# Patient Record
Sex: FEMALE | Race: BLACK OR AFRICAN AMERICAN | NOT HISPANIC OR LATINO | Employment: UNEMPLOYED
[De-identification: names, ages, dates, MRNs, and addresses within clinical notes are randomized per-mention and may not be internally consistent; named-entity substitution may affect disease eponyms.]

---

## 2017-03-31 ENCOUNTER — CHARTING TRANS (OUTPATIENT)
Dept: OTHER | Age: 42
End: 2017-03-31

## 2017-03-31 ENCOUNTER — LAB SERVICES (OUTPATIENT)
Dept: OTHER | Age: 42
End: 2017-03-31

## 2017-03-31 LAB — RAPID STREP GROUP A: NORMAL

## 2017-04-05 ENCOUNTER — CHARTING TRANS (OUTPATIENT)
Dept: OTHER | Age: 42
End: 2017-04-05

## 2017-04-05 LAB — CULTURE STREP GRP A (STTH) HL: NORMAL

## 2017-04-14 ENCOUNTER — CHARTING TRANS (OUTPATIENT)
Dept: OTHER | Age: 42
End: 2017-04-14

## 2017-12-13 ENCOUNTER — CHARTING TRANS (OUTPATIENT)
Dept: OTHER | Age: 42
End: 2017-12-13

## 2018-04-18 ENCOUNTER — CHARTING TRANS (OUTPATIENT)
Dept: OTHER | Age: 43
End: 2018-04-18

## 2018-09-25 ENCOUNTER — LAB SERVICES (OUTPATIENT)
Dept: OTHER | Age: 43
End: 2018-09-25

## 2018-09-25 ENCOUNTER — CHARTING TRANS (OUTPATIENT)
Dept: OTHER | Age: 43
End: 2018-09-25

## 2018-09-25 LAB — RAPID STREP GROUP A: NORMAL

## 2018-09-25 ASSESSMENT — PAIN SCALES - GENERAL: PAINLEVEL_OUTOF10: 5

## 2018-09-26 ENCOUNTER — CHARTING TRANS (OUTPATIENT)
Dept: OTHER | Age: 43
End: 2018-09-26

## 2018-09-26 ASSESSMENT — PAIN SCALES - GENERAL: PAINLEVEL_OUTOF10: 0

## 2018-11-01 VITALS
DIASTOLIC BLOOD PRESSURE: 74 MMHG | OXYGEN SATURATION: 98 % | RESPIRATION RATE: 20 BRPM | SYSTOLIC BLOOD PRESSURE: 118 MMHG | HEART RATE: 67 BPM | TEMPERATURE: 98.6 F

## 2018-11-02 VITALS — TEMPERATURE: 98.8 F | HEART RATE: 94 BPM | RESPIRATION RATE: 20 BRPM | OXYGEN SATURATION: 98 %

## 2018-11-04 VITALS — HEART RATE: 73 BPM | OXYGEN SATURATION: 98 % | RESPIRATION RATE: 20 BRPM | TEMPERATURE: 98.5 F

## 2018-11-05 VITALS
BODY MASS INDEX: 29.82 KG/M2 | WEIGHT: 189.99 LBS | TEMPERATURE: 99.4 F | OXYGEN SATURATION: 99 % | HEART RATE: 85 BPM | HEIGHT: 67 IN | RESPIRATION RATE: 18 BRPM

## 2018-11-27 VITALS
OXYGEN SATURATION: 99 % | DIASTOLIC BLOOD PRESSURE: 80 MMHG | BODY MASS INDEX: 30.37 KG/M2 | SYSTOLIC BLOOD PRESSURE: 102 MMHG | RESPIRATION RATE: 16 BRPM | WEIGHT: 189 LBS | TEMPERATURE: 98.7 F | HEART RATE: 74 BPM | HEIGHT: 66 IN

## 2018-11-27 VITALS — TEMPERATURE: 98.3 F | OXYGEN SATURATION: 97 % | RESPIRATION RATE: 18 BRPM | HEART RATE: 90 BPM

## 2019-02-11 ENCOUNTER — WALK IN (OUTPATIENT)
Dept: URGENT CARE | Age: 44
End: 2019-02-11

## 2019-02-11 DIAGNOSIS — R50.9 FEVER, UNSPECIFIED FEVER CAUSE: ICD-10-CM

## 2019-02-11 DIAGNOSIS — R68.89 FLU-LIKE SYMPTOMS: ICD-10-CM

## 2019-02-11 DIAGNOSIS — J02.9 SORE THROAT: ICD-10-CM

## 2019-02-11 DIAGNOSIS — J11.1 INFLUENZA: Primary | ICD-10-CM

## 2019-02-11 LAB
FLUAV AG UPPER RESP QL IA.RAPID: NEGATIVE
FLUBV AG UPPER RESP QL IA.RAPID: NEGATIVE
INTERNAL PROCEDURAL CONTROLS ACCEPTABLE: YES
S PYO AG THROAT QL IA.RAPID: NEGATIVE

## 2019-02-11 PROCEDURE — 87880 STREP A ASSAY W/OPTIC: CPT | Performed by: NURSE PRACTITIONER

## 2019-02-11 PROCEDURE — 87804 INFLUENZA ASSAY W/OPTIC: CPT | Performed by: NURSE PRACTITIONER

## 2019-02-11 PROCEDURE — 99214 OFFICE O/P EST MOD 30 MIN: CPT | Performed by: NURSE PRACTITIONER

## 2019-02-11 RX ORDER — MONTELUKAST SODIUM 10 MG/1
TABLET ORAL
COMMUNITY
Start: 2018-10-17 | End: 2019-03-01 | Stop reason: SDUPTHER

## 2019-02-11 RX ORDER — OSELTAMIVIR PHOSPHATE 75 MG/1
75 CAPSULE ORAL 2 TIMES DAILY
Qty: 10 CAPSULE | Refills: 0 | Status: SHIPPED | OUTPATIENT
Start: 2019-02-11 | End: 2019-02-16

## 2019-02-11 RX ORDER — IBUPROFEN 600 MG/1
600 TABLET ORAL EVERY 6 HOURS PRN
Qty: 30 TABLET | Refills: 0 | Status: SHIPPED | OUTPATIENT
Start: 2019-02-11 | End: 2019-03-18 | Stop reason: SDUPTHER

## 2019-02-11 RX ORDER — ALBUTEROL SULFATE 90 UG/1
AEROSOL, METERED RESPIRATORY (INHALATION)
COMMUNITY
End: 2019-03-26 | Stop reason: SDUPTHER

## 2019-02-11 SDOH — HEALTH STABILITY: MENTAL HEALTH: HOW OFTEN DO YOU HAVE A DRINK CONTAINING ALCOHOL?: NEVER

## 2019-02-11 ASSESSMENT — ENCOUNTER SYMPTOMS
FATIGUE: 1
DIARRHEA: 0
PSYCHIATRIC NEGATIVE: 1
COUGH: 1
VOMITING: 0
NAUSEA: 0
SORE THROAT: 1
CHILLS: 1
SHORTNESS OF BREATH: 0
WHEEZING: 0
EYES NEGATIVE: 1
FEVER: 1

## 2019-02-11 ASSESSMENT — PAIN SCALES - GENERAL: PAINLEVEL: 9-10

## 2019-03-01 RX ORDER — MONTELUKAST SODIUM 10 MG/1
TABLET ORAL
Qty: 90 TABLET | Refills: 3 | Status: SHIPPED | OUTPATIENT
Start: 2019-03-01 | End: 2019-03-26 | Stop reason: SDUPTHER

## 2019-03-18 ENCOUNTER — WALK IN (OUTPATIENT)
Dept: URGENT CARE | Age: 44
End: 2019-03-18

## 2019-03-18 DIAGNOSIS — R50.9 FEVER, UNSPECIFIED FEVER CAUSE: ICD-10-CM

## 2019-03-18 DIAGNOSIS — J11.1 INFLUENZA: ICD-10-CM

## 2019-03-18 DIAGNOSIS — J01.20 ACUTE NON-RECURRENT ETHMOIDAL SINUSITIS: Primary | ICD-10-CM

## 2019-03-18 DIAGNOSIS — J02.9 SORE THROAT: ICD-10-CM

## 2019-03-18 PROCEDURE — 99214 OFFICE O/P EST MOD 30 MIN: CPT | Performed by: NURSE PRACTITIONER

## 2019-03-18 RX ORDER — IBUPROFEN 600 MG/1
600 TABLET ORAL EVERY 6 HOURS PRN
Qty: 30 TABLET | Refills: 0 | Status: SHIPPED | OUTPATIENT
Start: 2019-03-18

## 2019-03-18 RX ORDER — DOXYCYCLINE 100 MG/1
100 CAPSULE ORAL 2 TIMES DAILY
Qty: 14 CAPSULE | Refills: 0 | Status: SHIPPED | OUTPATIENT
Start: 2019-03-18 | End: 2019-03-25

## 2019-03-18 RX ORDER — IBUPROFEN 600 MG/1
600 TABLET ORAL EVERY 6 HOURS PRN
Qty: 30 TABLET | Refills: 0 | Status: SHIPPED | OUTPATIENT
Start: 2019-03-18 | End: 2019-03-18 | Stop reason: SDUPTHER

## 2019-03-18 SDOH — HEALTH STABILITY: MENTAL HEALTH: HOW OFTEN DO YOU HAVE A DRINK CONTAINING ALCOHOL?: NEVER

## 2019-03-18 ASSESSMENT — PAIN SCALES - GENERAL: PAINLEVEL: 9-10

## 2019-03-18 ASSESSMENT — ENCOUNTER SYMPTOMS
SINUS PAIN: 1
CHILLS: 0
HEADACHES: 1
NUMBNESS: 0
SORE THROAT: 1
RHINORRHEA: 1
WEAKNESS: 0
FEVER: 0
RESPIRATORY NEGATIVE: 1
DIZZINESS: 0
SINUS PRESSURE: 1
FATIGUE: 1

## 2019-03-26 ENCOUNTER — WALK IN (OUTPATIENT)
Dept: URGENT CARE | Age: 44
End: 2019-03-26

## 2019-03-26 VITALS
RESPIRATION RATE: 18 BRPM | OXYGEN SATURATION: 98 % | TEMPERATURE: 98.3 F | HEART RATE: 78 BPM | DIASTOLIC BLOOD PRESSURE: 80 MMHG | SYSTOLIC BLOOD PRESSURE: 122 MMHG

## 2019-03-26 DIAGNOSIS — J98.01 BRONCHOSPASM, ACUTE: ICD-10-CM

## 2019-03-26 DIAGNOSIS — J45.30 MILD PERSISTENT ASTHMA WITHOUT COMPLICATION: Primary | ICD-10-CM

## 2019-03-26 PROCEDURE — 99214 OFFICE O/P EST MOD 30 MIN: CPT | Performed by: NURSE PRACTITIONER

## 2019-03-26 RX ORDER — AZITHROMYCIN 250 MG/1
TABLET, FILM COATED ORAL
Qty: 6 TABLET | Refills: 0 | Status: SHIPPED | OUTPATIENT
Start: 2019-03-26 | End: 2019-10-21 | Stop reason: ALTCHOICE

## 2019-03-26 RX ORDER — MONTELUKAST SODIUM 10 MG/1
10 TABLET ORAL DAILY
Qty: 90 TABLET | Refills: 3 | Status: SHIPPED | OUTPATIENT
Start: 2019-03-26 | End: 2019-10-21 | Stop reason: SDUPTHER

## 2019-03-26 RX ORDER — ALBUTEROL SULFATE 90 UG/1
2 AEROSOL, METERED RESPIRATORY (INHALATION)
Qty: 1 INHALER | Refills: 12 | Status: SHIPPED | OUTPATIENT
Start: 2019-03-26 | End: 2019-10-21 | Stop reason: SDUPTHER

## 2019-03-26 RX ORDER — FLUTICASONE PROPIONATE 50 MCG
2 SPRAY, SUSPENSION (ML) NASAL DAILY
Qty: 1 BOTTLE | Refills: 12 | Status: SHIPPED | OUTPATIENT
Start: 2019-03-26 | End: 2023-11-06 | Stop reason: ALTCHOICE

## 2019-03-26 RX ORDER — BENZONATATE 100 MG/1
CAPSULE ORAL
Qty: 60 CAPSULE | Refills: 0 | Status: SHIPPED | OUTPATIENT
Start: 2019-03-26 | End: 2019-10-21 | Stop reason: SDUPTHER

## 2019-03-26 SDOH — HEALTH STABILITY: MENTAL HEALTH: HOW OFTEN DO YOU HAVE A DRINK CONTAINING ALCOHOL?: NEVER

## 2019-03-26 ASSESSMENT — ENCOUNTER SYMPTOMS
SORE THROAT: 0
HEADACHES: 0
TINGLING: 0
SPUTUM PRODUCTION: 0
COUGH: 1
DIZZINESS: 0
SINUS PAIN: 1
FEVER: 0
SHORTNESS OF BREATH: 0
CHILLS: 0
WHEEZING: 0

## 2019-07-03 ENCOUNTER — WALK IN (OUTPATIENT)
Dept: URGENT CARE | Age: 44
End: 2019-07-03

## 2019-07-03 VITALS
DIASTOLIC BLOOD PRESSURE: 70 MMHG | HEART RATE: 78 BPM | TEMPERATURE: 98.7 F | SYSTOLIC BLOOD PRESSURE: 116 MMHG | OXYGEN SATURATION: 98 % | RESPIRATION RATE: 18 BRPM

## 2019-07-03 DIAGNOSIS — J30.1 SEASONAL ALLERGIC RHINITIS DUE TO POLLEN: Primary | ICD-10-CM

## 2019-07-03 PROCEDURE — 99214 OFFICE O/P EST MOD 30 MIN: CPT | Performed by: NURSE PRACTITIONER

## 2019-07-03 RX ORDER — MONTELUKAST SODIUM 10 MG/1
10 TABLET ORAL NIGHTLY
Qty: 30 TABLET | Refills: 3 | Status: SHIPPED | OUTPATIENT
Start: 2019-07-03 | End: 2021-04-30 | Stop reason: SDUPTHER

## 2019-07-03 SDOH — HEALTH STABILITY: MENTAL HEALTH: HOW OFTEN DO YOU HAVE A DRINK CONTAINING ALCOHOL?: NEVER

## 2019-07-03 ASSESSMENT — ENCOUNTER SYMPTOMS
RHINORRHEA: 1
CHEST TIGHTNESS: 0
SORE THROAT: 0
SINUS PAIN: 0
FEVER: 0
WHEEZING: 0
CHILLS: 0
SHORTNESS OF BREATH: 0
TROUBLE SWALLOWING: 0
COUGH: 0
SINUS PRESSURE: 0

## 2019-09-26 ENCOUNTER — WALK IN (OUTPATIENT)
Dept: URGENT CARE | Age: 44
End: 2019-09-26
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
## 2019-09-26 PROCEDURE — 99213 OFFICE O/P EST LOW 20 MIN: CPT | Performed by: NURSE PRACTITIONER

## 2019-09-26 SDOH — HEALTH STABILITY: MENTAL HEALTH: HOW OFTEN DO YOU HAVE A DRINK CONTAINING ALCOHOL?: NEVER

## 2019-09-26 ASSESSMENT — ENCOUNTER SYMPTOMS
CONSTITUTIONAL NEGATIVE: 1
ROS SKIN COMMENTS: PER HPI
RESPIRATORY NEGATIVE: 1

## 2019-09-26 ASSESSMENT — PAIN SCALES - GENERAL: PAINLEVEL: 5-6

## 2019-10-21 ENCOUNTER — WALK IN (OUTPATIENT)
Dept: URGENT CARE | Age: 44
End: 2019-10-21

## 2019-10-21 VITALS
HEART RATE: 70 BPM | RESPIRATION RATE: 20 BRPM | OXYGEN SATURATION: 98 % | DIASTOLIC BLOOD PRESSURE: 88 MMHG | SYSTOLIC BLOOD PRESSURE: 140 MMHG | TEMPERATURE: 98.9 F

## 2019-10-21 DIAGNOSIS — J01.00 ACUTE NON-RECURRENT MAXILLARY SINUSITIS: ICD-10-CM

## 2019-10-21 DIAGNOSIS — J30.1 SEASONAL ALLERGIC RHINITIS DUE TO POLLEN: ICD-10-CM

## 2019-10-21 DIAGNOSIS — J45.41 MODERATE PERSISTENT ASTHMA WITH EXACERBATION: Primary | ICD-10-CM

## 2019-10-21 DIAGNOSIS — J30.2 SEASONAL ALLERGIES: ICD-10-CM

## 2019-10-21 PROCEDURE — 99213 OFFICE O/P EST LOW 20 MIN: CPT | Performed by: NURSE PRACTITIONER

## 2019-10-21 RX ORDER — PREDNISONE 20 MG/1
40 TABLET ORAL DAILY
Qty: 10 TABLET | Refills: 0 | Status: SHIPPED | OUTPATIENT
Start: 2019-10-21 | End: 2019-10-26

## 2019-10-21 RX ORDER — DOXYCYCLINE HYCLATE 100 MG/1
100 CAPSULE ORAL 2 TIMES DAILY
Qty: 14 CAPSULE | Refills: 0 | Status: SHIPPED | OUTPATIENT
Start: 2019-10-21 | End: 2019-10-28

## 2019-10-21 RX ORDER — ALBUTEROL SULFATE 2.5 MG/3ML
2.5 SOLUTION RESPIRATORY (INHALATION) EVERY 4 HOURS PRN
Qty: 375 ML | Refills: 12 | Status: SHIPPED | OUTPATIENT
Start: 2019-10-21 | End: 2019-11-20

## 2019-10-21 RX ORDER — MONTELUKAST SODIUM 10 MG/1
10 TABLET ORAL DAILY
Qty: 90 TABLET | Refills: 3 | Status: SHIPPED | OUTPATIENT
Start: 2019-10-21 | End: 2019-10-21 | Stop reason: SDUPTHER

## 2019-10-21 RX ORDER — ALBUTEROL SULFATE 90 UG/1
2 AEROSOL, METERED RESPIRATORY (INHALATION)
Qty: 1 INHALER | Refills: 12 | Status: SHIPPED | OUTPATIENT
Start: 2019-10-21

## 2019-10-21 RX ORDER — ALBUTEROL SULFATE 90 UG/1
2 AEROSOL, METERED RESPIRATORY (INHALATION)
Qty: 1 INHALER | Refills: 12 | Status: SHIPPED | OUTPATIENT
Start: 2019-10-21 | End: 2019-10-21 | Stop reason: SDUPTHER

## 2019-10-21 RX ORDER — BENZONATATE 100 MG/1
CAPSULE ORAL
Qty: 60 CAPSULE | Refills: 0 | Status: SHIPPED | OUTPATIENT
Start: 2019-10-21 | End: 2023-10-11 | Stop reason: ALTCHOICE

## 2019-10-21 RX ORDER — PREDNISONE 20 MG/1
40 TABLET ORAL DAILY
Qty: 10 TABLET | Refills: 0 | Status: SHIPPED | OUTPATIENT
Start: 2019-10-21 | End: 2019-10-21 | Stop reason: SDUPTHER

## 2019-10-21 ASSESSMENT — ENCOUNTER SYMPTOMS
DIZZINESS: 0
SHORTNESS OF BREATH: 1
SINUS PAIN: 0
TINGLING: 0
COUGH: 1
CHILLS: 1
SORE THROAT: 1
SPUTUM PRODUCTION: 0
WHEEZING: 0
HEADACHES: 1

## 2020-01-14 ENCOUNTER — TELEPHONE (OUTPATIENT)
Dept: SCHEDULING | Age: 45
End: 2020-01-14

## 2020-01-22 ENCOUNTER — APPOINTMENT (OUTPATIENT)
Dept: INTERNAL MEDICINE | Age: 45
End: 2020-01-22

## 2020-01-22 ENCOUNTER — TELEPHONE (OUTPATIENT)
Dept: SCHEDULING | Age: 45
End: 2020-01-22

## 2020-03-16 ENCOUNTER — TELEPHONE (OUTPATIENT)
Dept: SCHEDULING | Age: 45
End: 2020-03-16

## 2020-03-17 ENCOUNTER — OFFICE VISIT (OUTPATIENT)
Dept: INTERNAL MEDICINE | Age: 45
End: 2020-03-17

## 2020-03-17 ENCOUNTER — TELEPHONE (OUTPATIENT)
Dept: SCHEDULING | Age: 45
End: 2020-03-17

## 2020-03-17 ENCOUNTER — APPOINTMENT (OUTPATIENT)
Dept: INTERNAL MEDICINE | Age: 45
End: 2020-03-17

## 2020-03-17 VITALS
TEMPERATURE: 98.6 F | HEART RATE: 75 BPM | WEIGHT: 230.16 LBS | DIASTOLIC BLOOD PRESSURE: 68 MMHG | HEIGHT: 67 IN | SYSTOLIC BLOOD PRESSURE: 116 MMHG | RESPIRATION RATE: 20 BRPM | BODY MASS INDEX: 36.12 KG/M2

## 2020-03-17 DIAGNOSIS — J45.21 MILD INTERMITTENT ASTHMA WITH (ACUTE) EXACERBATION: Primary | ICD-10-CM

## 2020-03-17 DIAGNOSIS — J30.1 SEASONAL ALLERGIC RHINITIS DUE TO POLLEN: ICD-10-CM

## 2020-03-17 PROCEDURE — 99202 OFFICE O/P NEW SF 15 MIN: CPT | Performed by: INTERNAL MEDICINE

## 2020-03-17 RX ORDER — BECLOMETHASONE DIPROPIONATE HFA 40 UG/1
AEROSOL, METERED RESPIRATORY (INHALATION)
COMMUNITY
Start: 2020-03-17 | End: 2021-07-15

## 2020-03-17 RX ORDER — FLUTICASONE PROPIONATE 50 MCG
2 SPRAY, SUSPENSION (ML) NASAL DAILY
Qty: 16 G | Refills: 12 | Status: SHIPPED
Start: 2020-03-17 | End: 2022-09-23 | Stop reason: SDUPTHER

## 2020-03-17 RX ORDER — PREDNISONE 20 MG/1
20 TABLET ORAL DAILY
Qty: 30 TABLET | Refills: 0 | Status: SHIPPED | OUTPATIENT
Start: 2020-03-17 | End: 2020-10-19

## 2020-03-17 SDOH — HEALTH STABILITY: MENTAL HEALTH: HOW OFTEN DO YOU HAVE A DRINK CONTAINING ALCOHOL?: NEVER

## 2020-03-17 ASSESSMENT — PATIENT HEALTH QUESTIONNAIRE - PHQ9
2. FEELING DOWN, DEPRESSED OR HOPELESS: NOT AT ALL
SUM OF ALL RESPONSES TO PHQ9 QUESTIONS 1 AND 2: 0
1. LITTLE INTEREST OR PLEASURE IN DOING THINGS: NOT AT ALL
SUM OF ALL RESPONSES TO PHQ9 QUESTIONS 1 AND 2: 0

## 2020-03-18 ENCOUNTER — TELEPHONE (OUTPATIENT)
Dept: FAMILY MEDICINE | Age: 45
End: 2020-03-18

## 2020-03-18 ENCOUNTER — TELEPHONE (OUTPATIENT)
Dept: SCHEDULING | Age: 45
End: 2020-03-18

## 2020-07-05 ENCOUNTER — TELEPHONE (OUTPATIENT)
Dept: SCHEDULING | Age: 45
End: 2020-07-05

## 2020-07-06 RX ORDER — PREDNISONE 20 MG/1
20 TABLET ORAL DAILY
Qty: 30 TABLET | Refills: 0 | OUTPATIENT
Start: 2020-07-06

## 2020-09-10 ENCOUNTER — TELEPHONE (OUTPATIENT)
Dept: SCHEDULING | Age: 45
End: 2020-09-10

## 2020-10-06 ENCOUNTER — OFFICE VISIT (OUTPATIENT)
Dept: OBGYN CLINIC | Facility: CLINIC | Age: 45
End: 2020-10-06
Payer: MEDICAID

## 2020-10-06 VITALS
SYSTOLIC BLOOD PRESSURE: 126 MMHG | WEIGHT: 235 LBS | DIASTOLIC BLOOD PRESSURE: 80 MMHG | BODY MASS INDEX: 37.32 KG/M2 | HEIGHT: 66.5 IN

## 2020-10-06 DIAGNOSIS — K43.9 HERNIA OF ANTERIOR ABDOMINAL WALL: Primary | ICD-10-CM

## 2020-10-06 DIAGNOSIS — Z12.31 ENCOUNTER FOR SCREENING MAMMOGRAM FOR MALIGNANT NEOPLASM OF BREAST: ICD-10-CM

## 2020-10-06 PROCEDURE — 3008F BODY MASS INDEX DOCD: CPT | Performed by: OBSTETRICS & GYNECOLOGY

## 2020-10-06 PROCEDURE — 3079F DIAST BP 80-89 MM HG: CPT | Performed by: OBSTETRICS & GYNECOLOGY

## 2020-10-06 PROCEDURE — 3074F SYST BP LT 130 MM HG: CPT | Performed by: OBSTETRICS & GYNECOLOGY

## 2020-10-06 PROCEDURE — 99202 OFFICE O/P NEW SF 15 MIN: CPT | Performed by: OBSTETRICS & GYNECOLOGY

## 2020-10-06 NOTE — PROGRESS NOTES
GYN H&P Previous pt of mine    10/6/2020  1:53 PM    CC: Patient is here for abdominal    HPI: Patient is a 39year old  presents with a bulge above her umbilicus  that she thinks was caused from working out since 2019.  She had a CT scan done @ [facility])   LMP 09/07/2020   Breastfeeding No   BMI 37.36 kg/m²       Exam:   GENERAL: well developed, well nourished, in no apparent distress  SKIN: no rashes, no suspicious lesions    ABDOMEN: Soft, non distended; non tender, no masses.   Liver and spleen non-t

## 2020-10-19 RX ORDER — BECLOMETHASONE DIPROPIONATE HFA 40 UG/1
AEROSOL, METERED RESPIRATORY (INHALATION)
Qty: 10.6 G | OUTPATIENT
Start: 2020-10-19

## 2020-10-19 RX ORDER — PREDNISONE 20 MG/1
20 TABLET ORAL DAILY
Qty: 30 TABLET | Refills: 0 | Status: SHIPPED | OUTPATIENT
Start: 2020-10-19 | End: 2021-04-30 | Stop reason: SDUPTHER

## 2020-10-20 RX ORDER — BECLOMETHASONE DIPROPIONATE HFA 40 UG/1
AEROSOL, METERED RESPIRATORY (INHALATION)
Qty: 10.6 G | OUTPATIENT
Start: 2020-10-20

## 2021-04-10 DIAGNOSIS — J30.1 SEASONAL ALLERGIC RHINITIS DUE TO POLLEN: ICD-10-CM

## 2021-04-10 RX ORDER — MONTELUKAST SODIUM 10 MG/1
TABLET ORAL
Qty: 90 TABLET | OUTPATIENT
Start: 2021-04-10

## 2021-04-10 RX ORDER — ALBUTEROL SULFATE 90 UG/1
AEROSOL, METERED RESPIRATORY (INHALATION)
Qty: 18 G | OUTPATIENT
Start: 2021-04-10

## 2021-04-10 RX ORDER — PREDNISONE 20 MG/1
20 TABLET ORAL DAILY
Qty: 30 TABLET | Refills: 0 | OUTPATIENT
Start: 2021-04-10

## 2021-04-10 RX ORDER — ALBUTEROL SULFATE 2.5 MG/3ML
SOLUTION RESPIRATORY (INHALATION)
Qty: 375 ML | Refills: 12 | OUTPATIENT
Start: 2021-04-10

## 2021-04-10 RX ORDER — BECLOMETHASONE DIPROPIONATE HFA 40 UG/1
AEROSOL, METERED RESPIRATORY (INHALATION)
Qty: 10.6 G | OUTPATIENT
Start: 2021-04-10

## 2021-04-30 ENCOUNTER — WALK IN (OUTPATIENT)
Dept: URGENT CARE | Age: 46
End: 2021-04-30

## 2021-04-30 DIAGNOSIS — J45.21 MILD INTERMITTENT ASTHMA WITH (ACUTE) EXACERBATION: Primary | ICD-10-CM

## 2021-04-30 LAB — SARS-COV+SARS-COV-2 AG RESP QL IA.RAPID: NOT DETECTED

## 2021-04-30 PROCEDURE — 87426 SARSCOV CORONAVIRUS AG IA: CPT | Performed by: NURSE PRACTITIONER

## 2021-04-30 PROCEDURE — 99214 OFFICE O/P EST MOD 30 MIN: CPT | Performed by: NURSE PRACTITIONER

## 2021-04-30 RX ORDER — PREDNISONE 20 MG/1
TABLET ORAL
Qty: 10 TABLET | Refills: 0 | Status: SHIPPED | OUTPATIENT
Start: 2021-04-30 | End: 2021-09-10

## 2021-04-30 RX ORDER — MONTELUKAST SODIUM 10 MG/1
10 TABLET ORAL NIGHTLY
Qty: 30 TABLET | Refills: 11 | Status: SHIPPED | OUTPATIENT
Start: 2021-04-30 | End: 2023-07-21 | Stop reason: ALTCHOICE

## 2021-04-30 RX ORDER — ALBUTEROL SULFATE 90 UG/1
2 AEROSOL, METERED RESPIRATORY (INHALATION) EVERY 4 HOURS PRN
Qty: 1 EACH | Refills: 3 | Status: SHIPPED | OUTPATIENT
Start: 2021-04-30 | End: 2023-10-11 | Stop reason: ALTCHOICE

## 2021-04-30 ASSESSMENT — PAIN SCALES - GENERAL: PAINLEVEL: 0

## 2021-04-30 ASSESSMENT — ENCOUNTER SYMPTOMS
CHILLS: 0
COUGH: 1
SHORTNESS OF BREATH: 1
RHINORRHEA: 0
CHEST TIGHTNESS: 1
WHEEZING: 0
FEVER: 0
SPUTUM PRODUCTION: 0

## 2021-05-26 VITALS
RESPIRATION RATE: 18 BRPM | WEIGHT: 195 LBS | DIASTOLIC BLOOD PRESSURE: 82 MMHG | RESPIRATION RATE: 18 BRPM | HEART RATE: 74 BPM | SYSTOLIC BLOOD PRESSURE: 118 MMHG | TEMPERATURE: 98.3 F | TEMPERATURE: 96.9 F | OXYGEN SATURATION: 97 % | TEMPERATURE: 102.7 F | HEIGHT: 67 IN | WEIGHT: 241.62 LBS | HEIGHT: 67 IN | OXYGEN SATURATION: 100 % | HEART RATE: 87 BPM | HEART RATE: 96 BPM | TEMPERATURE: 98.2 F | DIASTOLIC BLOOD PRESSURE: 80 MMHG | SYSTOLIC BLOOD PRESSURE: 132 MMHG | HEIGHT: 66 IN | HEIGHT: 67 IN | BODY MASS INDEX: 37.92 KG/M2 | RESPIRATION RATE: 18 BRPM | BODY MASS INDEX: 30.61 KG/M2 | BODY MASS INDEX: 28.93 KG/M2 | DIASTOLIC BLOOD PRESSURE: 82 MMHG | OXYGEN SATURATION: 99 % | SYSTOLIC BLOOD PRESSURE: 140 MMHG | BODY MASS INDEX: 30.61 KG/M2 | WEIGHT: 195 LBS | WEIGHT: 180 LBS | SYSTOLIC BLOOD PRESSURE: 138 MMHG | HEART RATE: 80 BPM | DIASTOLIC BLOOD PRESSURE: 72 MMHG

## 2021-07-13 RX ORDER — PREDNISONE 20 MG/1
TABLET ORAL
Qty: 10 TABLET | Refills: 0 | OUTPATIENT
Start: 2021-07-13

## 2021-07-14 ENCOUNTER — WALK IN (OUTPATIENT)
Dept: URGENT CARE | Age: 46
End: 2021-07-14

## 2021-07-14 VITALS
TEMPERATURE: 99.5 F | HEART RATE: 80 BPM | OXYGEN SATURATION: 98 % | DIASTOLIC BLOOD PRESSURE: 84 MMHG | RESPIRATION RATE: 18 BRPM | SYSTOLIC BLOOD PRESSURE: 126 MMHG

## 2021-07-14 DIAGNOSIS — J45.21 MILD INTERMITTENT ASTHMA WITH (ACUTE) EXACERBATION: Primary | ICD-10-CM

## 2021-07-14 DIAGNOSIS — R09.89 CHEST CONGESTION: ICD-10-CM

## 2021-07-14 PROCEDURE — U0003 INFECTIOUS AGENT DETECTION BY NUCLEIC ACID (DNA OR RNA); SEVERE ACUTE RESPIRATORY SYNDROME CORONAVIRUS 2 (SARS-COV-2) (CORONAVIRUS DISEASE [COVID-19]), AMPLIFIED PROBE TECHNIQUE, MAKING USE OF HIGH THROUGHPUT TECHNOLOGIES AS DESCRIBED BY CMS-2020-01-R: HCPCS | Performed by: NURSE PRACTITIONER

## 2021-07-14 PROCEDURE — 99213 OFFICE O/P EST LOW 20 MIN: CPT | Performed by: NURSE PRACTITIONER

## 2021-07-14 PROCEDURE — U0005 INFEC AGEN DETEC AMPLI PROBE: HCPCS | Performed by: NURSE PRACTITIONER

## 2021-07-14 RX ORDER — PREDNISONE 20 MG/1
40 TABLET ORAL DAILY
Qty: 10 TABLET | Refills: 0 | Status: SHIPPED | OUTPATIENT
Start: 2021-07-14 | End: 2021-07-19

## 2021-07-14 ASSESSMENT — ENCOUNTER SYMPTOMS
SORE THROAT: 0
SINUS PRESSURE: 0
CONSTITUTIONAL NEGATIVE: 1
SHORTNESS OF BREATH: 0
COUGH: 1
CHEST TIGHTNESS: 0
RHINORRHEA: 1
WHEEZING: 0

## 2021-07-15 ENCOUNTER — TELEPHONE (OUTPATIENT)
Dept: URGENT CARE | Age: 46
End: 2021-07-15

## 2021-07-15 LAB
SARS-COV-2 RNA RESP QL NAA+PROBE: NOT DETECTED
SERVICE CMNT-IMP: NORMAL
SERVICE CMNT-IMP: NORMAL

## 2021-07-15 RX ORDER — DEXAMETHASONE 4 MG/1
1 TABLET ORAL 2 TIMES DAILY
Qty: 12 G | Refills: 12 | Status: SHIPPED | OUTPATIENT
Start: 2021-07-15

## 2021-09-10 RX ORDER — PREDNISONE 20 MG/1
TABLET ORAL
Qty: 10 TABLET | Refills: 0 | Status: SHIPPED | OUTPATIENT
Start: 2021-09-10 | End: 2021-11-02 | Stop reason: ALTCHOICE

## 2021-10-22 ENCOUNTER — WALK IN (OUTPATIENT)
Dept: URGENT CARE | Age: 46
End: 2021-10-22

## 2021-10-22 VITALS
TEMPERATURE: 98.4 F | DIASTOLIC BLOOD PRESSURE: 80 MMHG | SYSTOLIC BLOOD PRESSURE: 124 MMHG | RESPIRATION RATE: 18 BRPM | HEART RATE: 84 BPM

## 2021-10-22 DIAGNOSIS — H01.001 BLEPHARITIS OF RIGHT UPPER EYELID, UNSPECIFIED TYPE: Primary | ICD-10-CM

## 2021-10-22 PROCEDURE — 99212 OFFICE O/P EST SF 10 MIN: CPT | Performed by: NURSE PRACTITIONER

## 2021-10-22 RX ORDER — ERYTHROMYCIN 5 MG/G
OINTMENT OPHTHALMIC
Qty: 3.5 G | Refills: 0 | Status: SHIPPED | OUTPATIENT
Start: 2021-10-22 | End: 2023-10-11 | Stop reason: ALTCHOICE

## 2021-10-22 ASSESSMENT — VISUAL ACUITY: OU: 1

## 2021-10-22 ASSESSMENT — ENCOUNTER SYMPTOMS
EYE PAIN: 0
EYE ITCHING: 1
PHOTOPHOBIA: 0
BLURRED VISION: 0
FEVER: 0
FOREIGN BODY SENSATION: 0
EYE REDNESS: 0
RESPIRATORY NEGATIVE: 1
EYE DISCHARGE: 0

## 2021-11-02 ENCOUNTER — WALK IN (OUTPATIENT)
Dept: URGENT CARE | Age: 46
End: 2021-11-02

## 2021-11-02 VITALS
SYSTOLIC BLOOD PRESSURE: 120 MMHG | TEMPERATURE: 98.6 F | HEART RATE: 84 BPM | DIASTOLIC BLOOD PRESSURE: 80 MMHG | RESPIRATION RATE: 18 BRPM | OXYGEN SATURATION: 97 %

## 2021-11-02 DIAGNOSIS — J45.21 MILD INTERMITTENT ASTHMA WITH (ACUTE) EXACERBATION: Primary | ICD-10-CM

## 2021-11-02 PROCEDURE — 99213 OFFICE O/P EST LOW 20 MIN: CPT | Performed by: NURSE PRACTITIONER

## 2021-11-02 RX ORDER — PREDNISONE 20 MG/1
40 TABLET ORAL DAILY
Qty: 10 TABLET | Refills: 0 | Status: SHIPPED | OUTPATIENT
Start: 2021-11-02 | End: 2021-11-07

## 2021-11-02 ASSESSMENT — ENCOUNTER SYMPTOMS
SHORTNESS OF BREATH: 1
WHEEZING: 0
CHILLS: 0
SINUS PRESSURE: 0
COUGH: 1
CHEST TIGHTNESS: 1
RHINORRHEA: 1
SORE THROAT: 0
FEVER: 0
TROUBLE SWALLOWING: 0
SINUS PAIN: 0

## 2021-11-29 LAB
CYTOLOGY CVX/VAG DOC THIN PREP: NORMAL
HPV16+18+45 E6+E7MRNA CVX NAA+PROBE: NEGATIVE

## 2021-12-14 ENCOUNTER — OFFICE VISIT (OUTPATIENT)
Dept: OBGYN CLINIC | Facility: CLINIC | Age: 46
End: 2021-12-14
Payer: MEDICAID

## 2021-12-14 VITALS
HEIGHT: 66.5 IN | SYSTOLIC BLOOD PRESSURE: 124 MMHG | BODY MASS INDEX: 37.96 KG/M2 | DIASTOLIC BLOOD PRESSURE: 82 MMHG | WEIGHT: 239 LBS

## 2021-12-14 DIAGNOSIS — K42.9 UMBILICAL HERNIA WITHOUT OBSTRUCTION AND WITHOUT GANGRENE: Primary | ICD-10-CM

## 2021-12-14 PROCEDURE — 99212 OFFICE O/P EST SF 10 MIN: CPT | Performed by: OBSTETRICS & GYNECOLOGY

## 2021-12-14 PROCEDURE — 3074F SYST BP LT 130 MM HG: CPT | Performed by: OBSTETRICS & GYNECOLOGY

## 2021-12-14 PROCEDURE — 3079F DIAST BP 80-89 MM HG: CPT | Performed by: OBSTETRICS & GYNECOLOGY

## 2021-12-14 PROCEDURE — 3008F BODY MASS INDEX DOCD: CPT | Performed by: OBSTETRICS & GYNECOLOGY

## 2021-12-14 RX ORDER — MONTELUKAST SODIUM 10 MG/1
10 TABLET ORAL NIGHTLY
COMMUNITY
Start: 2021-10-10

## 2021-12-14 NOTE — PROGRESS NOTES
CC: Patient is here for referral for umbilical heria repair. HPI: Patient is a 42year old  for evaluation of umbilical hernia. Patient's last menstrual period was 2021.     OB History    Para Term  AB Living   3 3 3 Stress Concern: Not Asked        Weight Concern: Not Asked        Special Diet: Not Asked        Back Care: Not Asked        Exercise: Not Asked        Bike Helmet: Not Asked        Seat Belt: Not Asked        Self-Exams: Not Asked    Social History Na

## 2022-03-10 ENCOUNTER — WALK IN (OUTPATIENT)
Dept: URGENT CARE | Age: 47
End: 2022-03-10

## 2022-03-10 VITALS
OXYGEN SATURATION: 98 % | TEMPERATURE: 98.9 F | DIASTOLIC BLOOD PRESSURE: 82 MMHG | SYSTOLIC BLOOD PRESSURE: 132 MMHG | HEART RATE: 88 BPM | BODY MASS INDEX: 37.83 KG/M2 | RESPIRATION RATE: 18 BRPM | HEIGHT: 67 IN | WEIGHT: 241 LBS

## 2022-03-10 DIAGNOSIS — J06.9 VIRAL URI WITH COUGH: Primary | ICD-10-CM

## 2022-03-10 LAB
INTERNAL PROCEDURAL CONTROLS ACCEPTABLE: YES
S PYO AG THROAT QL IA.RAPID: NEGATIVE

## 2022-03-10 PROCEDURE — U0003 INFECTIOUS AGENT DETECTION BY NUCLEIC ACID (DNA OR RNA); SEVERE ACUTE RESPIRATORY SYNDROME CORONAVIRUS 2 (SARS-COV-2) (CORONAVIRUS DISEASE [COVID-19]), AMPLIFIED PROBE TECHNIQUE, MAKING USE OF HIGH THROUGHPUT TECHNOLOGIES AS DESCRIBED BY CMS-2020-01-R: HCPCS | Performed by: PSYCHIATRY & NEUROLOGY

## 2022-03-10 PROCEDURE — 87880 STREP A ASSAY W/OPTIC: CPT | Performed by: NURSE PRACTITIONER

## 2022-03-10 PROCEDURE — 99213 OFFICE O/P EST LOW 20 MIN: CPT | Performed by: NURSE PRACTITIONER

## 2022-03-10 PROCEDURE — U0005 INFEC AGEN DETEC AMPLI PROBE: HCPCS | Performed by: PSYCHIATRY & NEUROLOGY

## 2022-03-10 RX ORDER — PREDNISONE 20 MG/1
40 TABLET ORAL DAILY
Qty: 10 TABLET | Refills: 0 | Status: SHIPPED | OUTPATIENT
Start: 2022-03-10 | End: 2022-03-15

## 2022-03-10 ASSESSMENT — PAIN SCALES - GENERAL: PAINLEVEL: 4

## 2022-03-10 ASSESSMENT — ENCOUNTER SYMPTOMS
FEVER: 0
WHEEZING: 0
SHORTNESS OF BREATH: 0
CHEST TIGHTNESS: 1
SORE THROAT: 1
COUGH: 1
TROUBLE SWALLOWING: 0
CHILLS: 0
HEADACHES: 0
RHINORRHEA: 0
SINUS PRESSURE: 0
SINUS PAIN: 0

## 2022-03-11 ENCOUNTER — TELEPHONE (OUTPATIENT)
Dept: SCHEDULING | Age: 47
End: 2022-03-11

## 2022-03-11 ENCOUNTER — TELEPHONE (OUTPATIENT)
Dept: URGENT CARE | Age: 47
End: 2022-03-11

## 2022-03-11 LAB
SARS-COV-2 RNA RESP QL NAA+PROBE: NOT DETECTED
SERVICE CMNT-IMP: NORMAL
SERVICE CMNT-IMP: NORMAL

## 2022-03-14 ENCOUNTER — TELEPHONE (OUTPATIENT)
Dept: INTERNAL MEDICINE | Age: 47
End: 2022-03-14

## 2022-04-04 ENCOUNTER — TELEPHONE (OUTPATIENT)
Dept: SCHEDULING | Age: 47
End: 2022-04-04

## 2022-04-04 ENCOUNTER — NURSE TRIAGE (OUTPATIENT)
Dept: SCHEDULING | Age: 47
End: 2022-04-04

## 2022-04-05 ENCOUNTER — APPOINTMENT (OUTPATIENT)
Dept: INTERNAL MEDICINE | Age: 47
End: 2022-04-05

## 2022-04-05 ENCOUNTER — TELEPHONE (OUTPATIENT)
Dept: SCHEDULING | Age: 47
End: 2022-04-05

## 2022-04-08 ENCOUNTER — TELEPHONE (OUTPATIENT)
Dept: SCHEDULING | Age: 47
End: 2022-04-08

## 2022-04-08 ENCOUNTER — APPOINTMENT (OUTPATIENT)
Dept: INTERNAL MEDICINE | Age: 47
End: 2022-04-08

## 2022-09-23 ENCOUNTER — WALK IN (OUTPATIENT)
Dept: URGENT CARE | Age: 47
End: 2022-09-23

## 2022-09-23 VITALS
RESPIRATION RATE: 14 BRPM | DIASTOLIC BLOOD PRESSURE: 76 MMHG | BODY MASS INDEX: 31.39 KG/M2 | HEIGHT: 67 IN | SYSTOLIC BLOOD PRESSURE: 128 MMHG | WEIGHT: 200 LBS | TEMPERATURE: 98.7 F | HEART RATE: 86 BPM | OXYGEN SATURATION: 100 %

## 2022-09-23 DIAGNOSIS — J30.2 SEASONAL ALLERGIES: ICD-10-CM

## 2022-09-23 DIAGNOSIS — J02.9 SORE THROAT: Primary | ICD-10-CM

## 2022-09-23 LAB
INTERNAL PROCEDURAL CONTROLS ACCEPTABLE: YES
INTERNAL PROCEDURAL CONTROLS ACCEPTABLE: YES
S PYO AG THROAT QL IA.RAPID: NEGATIVE
SARS-COV+SARS-COV-2 AG RESP QL IA.RAPID: NOT DETECTED
TEST LOT EXPIRATION DATE: NORMAL
TEST LOT EXPIRATION DATE: NORMAL
TEST LOT NUMBER: NORMAL
TEST LOT NUMBER: NORMAL

## 2022-09-23 PROCEDURE — 99212 OFFICE O/P EST SF 10 MIN: CPT | Performed by: NURSE PRACTITIONER

## 2022-09-23 PROCEDURE — 87880 STREP A ASSAY W/OPTIC: CPT | Performed by: NURSE PRACTITIONER

## 2022-09-23 PROCEDURE — 87426 SARSCOV CORONAVIRUS AG IA: CPT | Performed by: NURSE PRACTITIONER

## 2022-09-23 RX ORDER — FLUTICASONE PROPIONATE 50 MCG
2 SPRAY, SUSPENSION (ML) NASAL DAILY
Qty: 16 G | Refills: 12 | Status: SHIPPED | OUTPATIENT
Start: 2022-09-23 | End: 2023-07-21 | Stop reason: SDUPTHER

## 2022-09-23 ASSESSMENT — ENCOUNTER SYMPTOMS
HEADACHES: 0
SINUS PRESSURE: 0
COUGH: 0
NAUSEA: 0
APPETITE CHANGE: 0
SWOLLEN GLANDS: 0
FACIAL SWELLING: 0
WEAKNESS: 0
SINUS PAIN: 0
EYES NEGATIVE: 1
SORE THROAT: 1
FEVER: 0
RHINORRHEA: 0
VOMITING: 0
DIAPHORESIS: 0
TROUBLE SWALLOWING: 0
CHILLS: 0
ACTIVITY CHANGE: 0
ABDOMINAL PAIN: 0
FATIGUE: 0
SHORTNESS OF BREATH: 0

## 2022-09-23 ASSESSMENT — PAIN SCALES - GENERAL: PAINLEVEL: 10

## 2023-03-20 ENCOUNTER — APPOINTMENT (OUTPATIENT)
Dept: URGENT CARE | Age: 48
End: 2023-03-20

## 2023-07-21 ENCOUNTER — TELEPHONE (OUTPATIENT)
Dept: OTHER | Age: 48
End: 2023-07-21

## 2023-07-21 ENCOUNTER — WALK IN (OUTPATIENT)
Dept: URGENT CARE | Age: 48
End: 2023-07-21

## 2023-07-21 VITALS
SYSTOLIC BLOOD PRESSURE: 132 MMHG | BODY MASS INDEX: 36.61 KG/M2 | WEIGHT: 233.25 LBS | HEART RATE: 70 BPM | DIASTOLIC BLOOD PRESSURE: 82 MMHG | RESPIRATION RATE: 16 BRPM | HEIGHT: 67 IN | TEMPERATURE: 97.5 F | OXYGEN SATURATION: 100 %

## 2023-07-21 DIAGNOSIS — J45.901 ASTHMA EXACERBATION, MILD: Primary | ICD-10-CM

## 2023-07-21 LAB
INTERNAL PROCEDURAL CONTROLS ACCEPTABLE: YES
SARS-COV+SARS-COV-2 AG RESP QL IA.RAPID: NOT DETECTED
TEST LOT EXPIRATION DATE: NORMAL
TEST LOT NUMBER: NORMAL

## 2023-07-21 PROCEDURE — 87426 SARSCOV CORONAVIRUS AG IA: CPT | Performed by: NURSE PRACTITIONER

## 2023-07-21 PROCEDURE — 99213 OFFICE O/P EST LOW 20 MIN: CPT | Performed by: NURSE PRACTITIONER

## 2023-07-21 RX ORDER — LORATADINE 10 MG/1
10 TABLET ORAL DAILY PRN
COMMUNITY
Start: 2023-03-20 | End: 2023-10-11 | Stop reason: ALTCHOICE

## 2023-07-21 RX ORDER — LORATADINE 10 MG/1
10 TABLET ORAL DAILY
Qty: 30 TABLET | Refills: 1 | Status: SHIPPED | OUTPATIENT
Start: 2023-07-21 | End: 2023-11-06 | Stop reason: SDUPTHER

## 2023-07-21 RX ORDER — FLUTICASONE PROPIONATE 50 MCG
1 SPRAY, SUSPENSION (ML) NASAL DAILY
COMMUNITY
Start: 2023-03-20 | End: 2023-07-21 | Stop reason: SDUPTHER

## 2023-07-21 ASSESSMENT — ENCOUNTER SYMPTOMS
TROUBLE SWALLOWING: 0
CHEST TIGHTNESS: 1
GASTROINTESTINAL NEGATIVE: 1
SINUS PAIN: 0
NEUROLOGICAL NEGATIVE: 1
FEVER: 0
HEARTBURN: 0
SHORTNESS OF BREATH: 0
HEADACHES: 0
SORE THROAT: 0
SINUS PRESSURE: 0
WHEEZING: 0
RHINORRHEA: 0
COUGH: 1
VOICE CHANGE: 0
CONSTITUTIONAL NEGATIVE: 1

## 2023-07-21 ASSESSMENT — PAIN SCALES - GENERAL: PAINLEVEL: 7

## 2023-09-06 ENCOUNTER — WALK IN (OUTPATIENT)
Dept: URGENT CARE | Age: 48
End: 2023-09-06

## 2023-09-06 VITALS
SYSTOLIC BLOOD PRESSURE: 120 MMHG | DIASTOLIC BLOOD PRESSURE: 86 MMHG | TEMPERATURE: 97.9 F | HEART RATE: 78 BPM | RESPIRATION RATE: 16 BRPM | OXYGEN SATURATION: 99 %

## 2023-09-06 DIAGNOSIS — J45.20 MILD INTERMITTENT ASTHMA WITHOUT COMPLICATION: ICD-10-CM

## 2023-09-06 DIAGNOSIS — J20.9 BRONCHITIS WITH ASTHMA, ACUTE: Primary | ICD-10-CM

## 2023-09-06 DIAGNOSIS — J45.909 BRONCHITIS WITH ASTHMA, ACUTE: Primary | ICD-10-CM

## 2023-09-06 PROCEDURE — 99212 OFFICE O/P EST SF 10 MIN: CPT | Performed by: NURSE PRACTITIONER

## 2023-09-06 RX ORDER — ALBUTEROL SULFATE 90 UG/1
2 AEROSOL, METERED RESPIRATORY (INHALATION) EVERY 4 HOURS PRN
Qty: 1 EACH | Refills: 0 | Status: SHIPPED | OUTPATIENT
Start: 2023-09-06 | End: 2023-10-11 | Stop reason: ALTCHOICE

## 2023-09-06 RX ORDER — LORATADINE 10 MG/1
TABLET ORAL DAILY
COMMUNITY
Start: 2023-07-21 | End: 2023-10-11 | Stop reason: ALTCHOICE

## 2023-09-06 RX ORDER — PREDNISONE 20 MG/1
40 TABLET ORAL DAILY
Qty: 10 TABLET | Refills: 0 | Status: SHIPPED | OUTPATIENT
Start: 2023-09-06 | End: 2023-09-11

## 2023-09-06 RX ORDER — AZITHROMYCIN 250 MG/1
TABLET, FILM COATED ORAL
Qty: 6 TABLET | Refills: 0 | Status: SHIPPED | OUTPATIENT
Start: 2023-09-06 | End: 2023-09-11

## 2023-09-06 RX ORDER — FLUTICASONE PROPIONATE 50 MCG
2 SPRAY, SUSPENSION (ML) NASAL DAILY
COMMUNITY
Start: 2023-07-28 | End: 2023-11-06 | Stop reason: SDUPTHER

## 2023-09-06 RX ORDER — ALBUTEROL SULFATE 90 UG/1
2 AEROSOL, METERED RESPIRATORY (INHALATION) EVERY 4 HOURS PRN
COMMUNITY
Start: 2023-07-30 | End: 2023-09-06 | Stop reason: SDUPTHER

## 2023-09-06 RX ORDER — ALBUTEROL SULFATE 2.5 MG/3ML
2.5 SOLUTION RESPIRATORY (INHALATION) EVERY 6 HOURS PRN
Qty: 75 ML | Refills: 0 | Status: SHIPPED | OUTPATIENT
Start: 2023-09-06 | End: 2023-11-06 | Stop reason: SDUPTHER

## 2023-09-06 ASSESSMENT — ENCOUNTER SYMPTOMS
FEVER: 0
CHEST TIGHTNESS: 1
RHINORRHEA: 0
WHEEZING: 1
SORE THROAT: 0
SHORTNESS OF BREATH: 1
COUGH: 1

## 2023-10-11 ENCOUNTER — OFFICE VISIT (OUTPATIENT)
Dept: FAMILY MEDICINE | Age: 48
End: 2023-10-11

## 2023-10-11 VITALS
RESPIRATION RATE: 16 BRPM | HEIGHT: 67 IN | TEMPERATURE: 98.7 F | SYSTOLIC BLOOD PRESSURE: 119 MMHG | DIASTOLIC BLOOD PRESSURE: 82 MMHG | WEIGHT: 242.84 LBS | BODY MASS INDEX: 38.11 KG/M2 | OXYGEN SATURATION: 100 % | HEART RATE: 65 BPM

## 2023-10-11 DIAGNOSIS — B96.89 ACUTE BACTERIAL RHINOSINUSITIS: Primary | ICD-10-CM

## 2023-10-11 DIAGNOSIS — J01.90 ACUTE BACTERIAL RHINOSINUSITIS: Primary | ICD-10-CM

## 2023-10-11 DIAGNOSIS — J02.9 ACUTE PHARYNGITIS, UNSPECIFIED ETIOLOGY: ICD-10-CM

## 2023-10-11 PROBLEM — E66.9 OBESITY: Status: ACTIVE | Noted: 2018-10-03

## 2023-10-11 PROBLEM — K43.9 VENTRAL HERNIA WITHOUT OBSTRUCTION OR GANGRENE: Status: ACTIVE | Noted: 2019-11-15

## 2023-10-11 PROCEDURE — 99203 OFFICE O/P NEW LOW 30 MIN: CPT | Performed by: STUDENT IN AN ORGANIZED HEALTH CARE EDUCATION/TRAINING PROGRAM

## 2023-10-11 RX ORDER — DOXYCYCLINE HYCLATE 100 MG
100 TABLET ORAL 2 TIMES DAILY
Qty: 14 TABLET | Refills: 0 | Status: SHIPPED | OUTPATIENT
Start: 2023-10-11 | End: 2023-10-18

## 2023-10-11 RX ORDER — CODEINE PHOSPHATE AND GUAIFENESIN 10; 100 MG/5ML; MG/5ML
SOLUTION ORAL
COMMUNITY
Start: 2023-09-28

## 2023-10-11 ASSESSMENT — ENCOUNTER SYMPTOMS
UNEXPECTED WEIGHT CHANGE: 0
FEVER: 0
FACIAL SWELLING: 0
NEUROLOGICAL NEGATIVE: 1
COUGH: 1
SINUS PAIN: 1
WHEEZING: 0
EYE ITCHING: 0
EYE DISCHARGE: 0
CHEST TIGHTNESS: 1
VOICE CHANGE: 0
RHINORRHEA: 1
FATIGUE: 0
SINUS PRESSURE: 1
APPETITE CHANGE: 0
CHOKING: 1
ACTIVITY CHANGE: 0
SHORTNESS OF BREATH: 0
ALLERGIC/IMMUNOLOGIC NEGATIVE: 1
EYE REDNESS: 0
EYE PAIN: 0
DIAPHORESIS: 0
GASTROINTESTINAL NEGATIVE: 1
SORE THROAT: 1
CHILLS: 0
TROUBLE SWALLOWING: 0
PHOTOPHOBIA: 0
STRIDOR: 0
ENDOCRINE NEGATIVE: 1
APNEA: 1

## 2023-10-20 ENCOUNTER — EXTERNAL LAB (OUTPATIENT)
Dept: OTHER | Age: 48
End: 2023-10-20

## 2023-10-20 ENCOUNTER — TELEPHONE (OUTPATIENT)
Dept: FAMILY MEDICINE | Age: 48
End: 2023-10-20

## 2023-10-20 ENCOUNTER — TELEPHONE (OUTPATIENT)
Dept: INTERNAL MEDICINE | Age: 48
End: 2023-10-20

## 2023-10-20 LAB
ABSOLUTE IMMATURE GRANULOCYTES (OFFPRE24): 0 X10E3/UL (ref 0–0.1)
ALBUMIN SERPL-MCNC: 3.9 G/DL (ref 3.9–4.9)
ALBUMIN/GLOB SERPL: 1.3 {RATIO} (ref 1.2–2.2)
ALP SERPL-CCNC: 95 IU/L (ref 44–121)
ALT SERPL-CCNC: 17 IU/L (ref 0–32)
AST SERPL-CCNC: 18 IU/L (ref 0–40)
BASOPHILS # BLD: 0.1 X10E3/UL (ref 0–0.2)
BASOPHILS NFR BLD: 1 %
BILIRUB SERPL-MCNC: <0.2 MG/DL (ref 0–1.2)
BUN SERPL-MCNC: 9 MG/DL (ref 6–24)
BUN/CREAT SERPL: 13 (ref 9–23)
CALCIUM SERPL-MCNC: 9 MG/DL (ref 8.7–10.2)
CHLORIDE SERPL-SCNC: 105 MMOL/L (ref 96–106)
CO2 SERPL-SCNC: 25 MMOL/L (ref 20–29)
CREAT SERPL-MCNC: 0.72 MG/DL (ref 0.57–1)
EOSINOPHIL # BLD: 0.1 X10E3/UL (ref 0–0.4)
EOSINOPHIL NFR BLD: 2 %
ERYTHROCYTE [DISTWIDTH] IN BLOOD: 13 % (ref 11.7–15.4)
FERRITIN SERPL-MCNC: 21 NG/ML (ref 15–150)
GFR SERPLBLD SCHWARTZ-ARVRAT: 104 ML/MIN/1.73
GLOBULIN SER-MCNC: 3 G/DL (ref 1.5–4.5)
GLUCOSE SERPL-MCNC: 84 MG/DL (ref 70–99)
HCT VFR BLD CALC: 32.7 % (ref 34–46.6)
HGB BLD-MCNC: 10.7 G/DL (ref 11.1–15.9)
IMMATURE GRANULOCYTES (OFFPRE25): 0 %
IRON BINDING, UNBOUND (OFFPRE1): 236 UG/DL (ref 131–425)
IRON SATN MFR SERPL: 15 % (ref 15–55)
IRON SERPL-MCNC: 43 UG/DL (ref 27–159)
LAB RESULT: NORMAL
LENGTH OF FAST TIME PATIENT: NORMAL H
LYMPHOCYTES # BLD: 2 X10E3/UL (ref 0.7–3.1)
LYMPHOCYTES NFR BLD: 36 %
MCH RBC QN AUTO: 31.3 PG (ref 26.6–33)
MCHC RBC AUTO-ENTMCNC: 32.7 G/DL (ref 31.5–35.7)
MCV RBC AUTO: 96 FL (ref 79–97)
MONOCYTES # BLD: 0.6 X10E3/UL (ref 0.1–0.9)
MONOCYTES NFR BLD: 11 %
NEUTROPHILS # BLD: 2.8 X10E3/UL (ref 1.4–7)
NEUTROPHILS NFR BLD: 50 %
PLATELET # BLD: 278 X10E3/UL (ref 150–450)
POTASSIUM SERPL-SCNC: 4.2 MMOL/L (ref 3.5–5.2)
PROT SERPL-MCNC: 6.9 G/DL (ref 6–8.5)
RBC # BLD: 3.42 X10E6/UL (ref 3.77–5.28)
SODIUM SERPL-SCNC: 140 MMOL/L (ref 134–144)
TIBC SERPL-MCNC: 279 UG/DL (ref 250–450)
TRANSFERRIN SERPL-MCNC: 232 MG/DL (ref 192–364)
WBC # BLD: 5.6 X10E3/UL (ref 3.4–10.8)

## 2023-10-21 ENCOUNTER — EXTERNAL RECORD (OUTPATIENT)
Dept: HEALTH INFORMATION MANAGEMENT | Facility: OTHER | Age: 48
End: 2023-10-21

## 2023-10-23 ENCOUNTER — TELEPHONE (OUTPATIENT)
Dept: FAMILY MEDICINE | Age: 48
End: 2023-10-23

## 2023-10-24 ENCOUNTER — TELEPHONE (OUTPATIENT)
Dept: FAMILY MEDICINE | Age: 48
End: 2023-10-24

## 2023-10-24 ENCOUNTER — APPOINTMENT (OUTPATIENT)
Dept: FAMILY MEDICINE | Age: 48
End: 2023-10-24

## 2023-11-02 ENCOUNTER — TELEPHONE (OUTPATIENT)
Dept: FAMILY MEDICINE | Age: 48
End: 2023-11-02

## 2023-11-06 ENCOUNTER — APPOINTMENT (OUTPATIENT)
Dept: FAMILY MEDICINE | Age: 48
End: 2023-11-06

## 2023-11-06 ENCOUNTER — OFFICE VISIT (OUTPATIENT)
Dept: FAMILY MEDICINE | Age: 48
End: 2023-11-06

## 2023-11-06 VITALS
BODY MASS INDEX: 36.99 KG/M2 | HEIGHT: 67 IN | WEIGHT: 235.67 LBS | OXYGEN SATURATION: 100 % | HEART RATE: 70 BPM | DIASTOLIC BLOOD PRESSURE: 79 MMHG | RESPIRATION RATE: 16 BRPM | SYSTOLIC BLOOD PRESSURE: 119 MMHG

## 2023-11-06 DIAGNOSIS — J45.909 BRONCHITIS WITH ASTHMA, ACUTE: ICD-10-CM

## 2023-11-06 DIAGNOSIS — N92.6 MISSED PERIOD: Primary | ICD-10-CM

## 2023-11-06 DIAGNOSIS — J45.21 MILD INTERMITTENT ASTHMA WITH (ACUTE) EXACERBATION: ICD-10-CM

## 2023-11-06 DIAGNOSIS — Z12.31 ENCOUNTER FOR SCREENING MAMMOGRAM FOR MALIGNANT NEOPLASM OF BREAST: ICD-10-CM

## 2023-11-06 DIAGNOSIS — J20.9 BRONCHITIS WITH ASTHMA, ACUTE: ICD-10-CM

## 2023-11-06 DIAGNOSIS — J30.1 SEASONAL ALLERGIC RHINITIS DUE TO POLLEN: ICD-10-CM

## 2023-11-06 DIAGNOSIS — Z12.11 COLON CANCER SCREENING: ICD-10-CM

## 2023-11-06 DIAGNOSIS — D64.9 LOW HEMOGLOBIN: Chronic | ICD-10-CM

## 2023-11-06 LAB
B-HCG UR QL: NEGATIVE
INTERNAL PROCEDURAL CONTROLS ACCEPTABLE: YES
TEST LOT EXPIRATION DATE: NORMAL
TEST LOT NUMBER: NORMAL

## 2023-11-06 PROCEDURE — 81025 URINE PREGNANCY TEST: CPT | Performed by: STUDENT IN AN ORGANIZED HEALTH CARE EDUCATION/TRAINING PROGRAM

## 2023-11-06 PROCEDURE — 99214 OFFICE O/P EST MOD 30 MIN: CPT | Performed by: STUDENT IN AN ORGANIZED HEALTH CARE EDUCATION/TRAINING PROGRAM

## 2023-11-06 RX ORDER — LORATADINE 10 MG/1
10 TABLET ORAL DAILY
Qty: 30 TABLET | Refills: 1 | Status: SHIPPED | OUTPATIENT
Start: 2023-11-06 | End: 2024-01-05

## 2023-11-06 RX ORDER — FLUTICASONE PROPIONATE 50 MCG
2 SPRAY, SUSPENSION (ML) NASAL DAILY
Qty: 16 G | Refills: 1 | Status: SHIPPED | OUTPATIENT
Start: 2023-11-06

## 2023-11-06 RX ORDER — ALBUTEROL SULFATE 2.5 MG/3ML
2.5 SOLUTION RESPIRATORY (INHALATION) EVERY 6 HOURS PRN
Qty: 75 ML | Refills: 0 | Status: SHIPPED | OUTPATIENT
Start: 2023-11-06

## 2023-11-14 ENCOUNTER — OFFICE VISIT (OUTPATIENT)
Dept: OBGYN CLINIC | Facility: CLINIC | Age: 48
End: 2023-11-14
Payer: MEDICAID

## 2023-11-14 ENCOUNTER — LAB ENCOUNTER (OUTPATIENT)
Dept: LAB | Facility: REFERENCE LAB | Age: 48
End: 2023-11-14
Attending: OBSTETRICS & GYNECOLOGY
Payer: MEDICAID

## 2023-11-14 VITALS
HEIGHT: 66.5 IN | SYSTOLIC BLOOD PRESSURE: 144 MMHG | WEIGHT: 235 LBS | BODY MASS INDEX: 37.32 KG/M2 | DIASTOLIC BLOOD PRESSURE: 80 MMHG

## 2023-11-14 DIAGNOSIS — K42.9 UMBILICAL HERNIA WITHOUT OBSTRUCTION AND WITHOUT GANGRENE: ICD-10-CM

## 2023-11-14 DIAGNOSIS — N92.6 IRREGULAR PERIODS: Primary | ICD-10-CM

## 2023-11-14 DIAGNOSIS — N92.6 IRREGULAR PERIODS: ICD-10-CM

## 2023-11-14 LAB
B-HCG SERPL-ACNC: <2.6 MIU/ML
CONTROL LINE PRESENT WITH A CLEAR BACKGROUND (YES/NO): YES YES/NO
FSH SERPL-ACNC: 6.5 MIU/ML
KIT EXPIRATION DATE: NORMAL DATE
KIT LOT #: NORMAL NUMERIC
LH SERPL-ACNC: 3.2 MIU/ML
PREGNANCY TEST, URINE: NEGATIVE
PROLACTIN SERPL-MCNC: 9.8 NG/ML
TSI SER-ACNC: 0.75 MIU/ML (ref 0.55–4.78)

## 2023-11-14 PROCEDURE — 83001 ASSAY OF GONADOTROPIN (FSH): CPT

## 2023-11-14 PROCEDURE — 83520 IMMUNOASSAY QUANT NOS NONAB: CPT

## 2023-11-14 PROCEDURE — 3077F SYST BP >= 140 MM HG: CPT | Performed by: OBSTETRICS & GYNECOLOGY

## 2023-11-14 PROCEDURE — 81025 URINE PREGNANCY TEST: CPT | Performed by: OBSTETRICS & GYNECOLOGY

## 2023-11-14 PROCEDURE — 84146 ASSAY OF PROLACTIN: CPT | Performed by: OBSTETRICS & GYNECOLOGY

## 2023-11-14 PROCEDURE — 3008F BODY MASS INDEX DOCD: CPT | Performed by: OBSTETRICS & GYNECOLOGY

## 2023-11-14 PROCEDURE — 84702 CHORIONIC GONADOTROPIN TEST: CPT | Performed by: OBSTETRICS & GYNECOLOGY

## 2023-11-14 PROCEDURE — 36415 COLL VENOUS BLD VENIPUNCTURE: CPT

## 2023-11-14 PROCEDURE — 83002 ASSAY OF GONADOTROPIN (LH): CPT

## 2023-11-14 PROCEDURE — 99213 OFFICE O/P EST LOW 20 MIN: CPT | Performed by: OBSTETRICS & GYNECOLOGY

## 2023-11-14 PROCEDURE — 84443 ASSAY THYROID STIM HORMONE: CPT

## 2023-11-14 PROCEDURE — 3079F DIAST BP 80-89 MM HG: CPT | Performed by: OBSTETRICS & GYNECOLOGY

## 2023-11-14 RX ORDER — LORATADINE 10 MG/1
10 TABLET ORAL DAILY
COMMUNITY
Start: 2023-03-20 | End: 2024-01-05

## 2023-11-15 ENCOUNTER — TELEPHONE (OUTPATIENT)
Dept: OBGYN CLINIC | Facility: CLINIC | Age: 48
End: 2023-11-15

## 2023-11-15 NOTE — TELEPHONE ENCOUNTER
Called pt period irregular and has never occurred, tubes are sterilized denies stress at this time, planning wedding but very happy. Informed pt to wait another 2 weeks for cycle or pregnancy test.  Pt agrees.

## 2023-11-15 NOTE — TELEPHONE ENCOUNTER
Patient states her period was never irregular and this month her period did not come. She states her tests were all regular she would like to know why she missed her period. Patient just saw Dr Nikkie Spence on 11/14 and had labs done the same day.

## 2023-11-17 LAB — MULLERIAN AMH: 0.25 NG/ML

## 2023-12-01 ENCOUNTER — OFFICE VISIT (OUTPATIENT)
Dept: URGENT CARE | Age: 48
End: 2023-12-01

## 2023-12-01 VITALS
TEMPERATURE: 98.2 F | RESPIRATION RATE: 16 BRPM | SYSTOLIC BLOOD PRESSURE: 120 MMHG | HEART RATE: 74 BPM | OXYGEN SATURATION: 100 % | DIASTOLIC BLOOD PRESSURE: 80 MMHG

## 2023-12-01 DIAGNOSIS — J01.90 ACUTE BACTERIAL SINUSITIS: ICD-10-CM

## 2023-12-01 DIAGNOSIS — R05.9 COUGH, UNSPECIFIED TYPE: Primary | ICD-10-CM

## 2023-12-01 DIAGNOSIS — B96.89 ACUTE BACTERIAL SINUSITIS: ICD-10-CM

## 2023-12-01 PROCEDURE — 99213 OFFICE O/P EST LOW 20 MIN: CPT | Performed by: NURSE PRACTITIONER

## 2023-12-01 RX ORDER — DOXYCYCLINE HYCLATE 100 MG
100 TABLET ORAL 2 TIMES DAILY
Qty: 14 TABLET | Refills: 0 | Status: SHIPPED | OUTPATIENT
Start: 2023-12-01 | End: 2023-12-08

## 2023-12-01 ASSESSMENT — ENCOUNTER SYMPTOMS
DIARRHEA: 0
CHILLS: 0
FEVER: 0
RHINORRHEA: 1
DIZZINESS: 0
NAUSEA: 0
SWOLLEN GLANDS: 0
SINUS PRESSURE: 1
WHEEZING: 0
SORE THROAT: 0
LIGHT-HEADEDNESS: 0
HEADACHES: 0
SINUS PAIN: 0
ABDOMINAL PAIN: 0
VOMITING: 0
VOICE CHANGE: 1
COUGH: 1

## 2023-12-01 ASSESSMENT — PAIN SCALES - GENERAL: PAINLEVEL: 0

## 2023-12-21 ENCOUNTER — TELEPHONE (OUTPATIENT)
Dept: FAMILY MEDICINE | Age: 48
End: 2023-12-21

## 2024-02-13 ENCOUNTER — TELEPHONE (OUTPATIENT)
Dept: URGENT CARE | Age: 49
End: 2024-02-13

## 2024-02-13 RX ORDER — LORATADINE 10 MG/1
10 TABLET ORAL DAILY
Qty: 30 TABLET | Refills: 1 | Status: SHIPPED | OUTPATIENT
Start: 2024-02-13 | End: 2024-04-13

## 2024-03-11 ENCOUNTER — E-ADVICE (OUTPATIENT)
Dept: FAMILY MEDICINE | Age: 49
End: 2024-03-11

## 2024-05-01 ENCOUNTER — APPOINTMENT (OUTPATIENT)
Dept: FAMILY MEDICINE | Age: 49
End: 2024-05-01

## 2024-06-24 RX ORDER — LORATADINE 10 MG/1
10 TABLET ORAL DAILY
Qty: 30 TABLET | Refills: 1 | Status: SHIPPED | OUTPATIENT
Start: 2024-06-24 | End: 2024-08-23

## 2024-08-09 ENCOUNTER — TELEPHONE (OUTPATIENT)
Dept: FAMILY MEDICINE | Age: 49
End: 2024-08-09

## 2024-08-14 RX ORDER — DOXYCYCLINE HYCLATE 100 MG
TABLET ORAL
Qty: 14 TABLET | Refills: 0 | OUTPATIENT
Start: 2024-08-14

## 2024-12-17 DIAGNOSIS — J20.9 BRONCHITIS WITH ASTHMA, ACUTE (CMD): ICD-10-CM

## 2024-12-17 DIAGNOSIS — J45.909 BRONCHITIS WITH ASTHMA, ACUTE (CMD): ICD-10-CM

## 2024-12-18 RX ORDER — ALBUTEROL SULFATE 0.83 MG/ML
SOLUTION RESPIRATORY (INHALATION)
Qty: 75 ML | Refills: 0 | Status: SHIPPED | OUTPATIENT
Start: 2024-12-18

## 2025-01-14 ENCOUNTER — CLINICAL ABSTRACT (OUTPATIENT)
Dept: CARE COORDINATION | Age: 50
End: 2025-01-14

## 2025-05-30 ENCOUNTER — TELEPHONE (OUTPATIENT)
Dept: FAMILY MEDICINE | Age: 50
End: 2025-05-30

## 2025-06-02 ENCOUNTER — E-ADVICE (OUTPATIENT)
Dept: LAB | Age: 50
End: 2025-06-02

## 2025-06-11 DIAGNOSIS — J20.9 BRONCHITIS WITH ASTHMA, ACUTE (CMD): ICD-10-CM

## 2025-06-11 DIAGNOSIS — J45.909 BRONCHITIS WITH ASTHMA, ACUTE (CMD): ICD-10-CM

## 2025-06-11 RX ORDER — ALBUTEROL SULFATE 0.83 MG/ML
2.5 SOLUTION RESPIRATORY (INHALATION) EVERY 4 HOURS PRN
Qty: 75 ML | Refills: 0 | Status: SHIPPED | OUTPATIENT
Start: 2025-06-11

## 2025-06-11 RX ORDER — FLUTICASONE PROPIONATE 50 MCG
2 SPRAY, SUSPENSION (ML) NASAL DAILY
Qty: 48 G | OUTPATIENT
Start: 2025-06-11

## 2025-06-11 RX ORDER — FLUTICASONE PROPIONATE 50 MCG
2 SPRAY, SUSPENSION (ML) NASAL DAILY
Qty: 16 G | Refills: 1 | Status: SHIPPED | OUTPATIENT
Start: 2025-06-11

## 2025-06-11 RX ORDER — LORATADINE 10 MG/1
10 TABLET ORAL DAILY
Qty: 30 TABLET | Refills: 1 | Status: SHIPPED | OUTPATIENT
Start: 2025-06-11 | End: 2025-08-10

## 2025-06-18 ENCOUNTER — APPOINTMENT (OUTPATIENT)
Dept: FAMILY MEDICINE | Age: 50
End: 2025-06-18

## 2025-06-26 ENCOUNTER — APPOINTMENT (OUTPATIENT)
Dept: FAMILY MEDICINE | Age: 50
End: 2025-06-26

## 2025-06-26 VITALS
OXYGEN SATURATION: 97 % | SYSTOLIC BLOOD PRESSURE: 110 MMHG | BODY MASS INDEX: 37.47 KG/M2 | DIASTOLIC BLOOD PRESSURE: 78 MMHG | HEART RATE: 76 BPM | HEIGHT: 67 IN

## 2025-06-26 DIAGNOSIS — Z12.31 VISIT FOR SCREENING MAMMOGRAM: ICD-10-CM

## 2025-06-26 DIAGNOSIS — J45.909 BRONCHITIS WITH ASTHMA, ACUTE (CMD): ICD-10-CM

## 2025-06-26 DIAGNOSIS — J20.9 BRONCHITIS WITH ASTHMA, ACUTE (CMD): ICD-10-CM

## 2025-06-26 DIAGNOSIS — K43.9 VENTRAL HERNIA WITHOUT OBSTRUCTION OR GANGRENE: ICD-10-CM

## 2025-06-26 DIAGNOSIS — J45.21 MILD INTERMITTENT ASTHMA WITH (ACUTE) EXACERBATION (CMD): ICD-10-CM

## 2025-06-26 DIAGNOSIS — Z12.11 COLON CANCER SCREENING: Primary | ICD-10-CM

## 2025-06-26 DIAGNOSIS — J45.20 MILD INTERMITTENT ASTHMA WITHOUT COMPLICATION (CMD): ICD-10-CM

## 2025-06-26 RX ORDER — MONTELUKAST SODIUM 10 MG/1
10 TABLET ORAL DAILY
Qty: 30 TABLET | Refills: 0 | Status: SHIPPED | OUTPATIENT
Start: 2025-06-26 | End: 2025-07-26

## 2025-06-26 RX ORDER — ALBUTEROL SULFATE 90 UG/1
2 INHALANT RESPIRATORY (INHALATION)
Qty: 1 EACH | Refills: 3 | Status: SHIPPED | OUTPATIENT
Start: 2025-06-26 | End: 2025-06-26

## 2025-06-26 RX ORDER — MONTELUKAST SODIUM 10 MG/1
10 TABLET ORAL DAILY
Qty: 90 TABLET | OUTPATIENT
Start: 2025-06-26 | End: 2025-07-26

## 2025-06-26 RX ORDER — ALBUTEROL SULFATE 0.83 MG/ML
2.5 SOLUTION RESPIRATORY (INHALATION) EVERY 4 HOURS PRN
Qty: 75 ML | Refills: 1 | Status: SHIPPED | OUTPATIENT
Start: 2025-06-26

## 2025-06-26 RX ORDER — FLUTICASONE PROPIONATE 110 UG/1
1 AEROSOL, METERED RESPIRATORY (INHALATION) 2 TIMES DAILY
Qty: 12 G | Refills: 3 | Status: SHIPPED | OUTPATIENT
Start: 2025-06-26

## 2025-06-26 RX ORDER — MONTELUKAST SODIUM 10 MG/1
10 TABLET ORAL DAILY
COMMUNITY
End: 2025-06-26 | Stop reason: SDUPTHER

## 2025-06-26 RX ORDER — ALBUTEROL SULFATE 90 UG/1
2 INHALANT RESPIRATORY (INHALATION) EVERY 4 HOURS PRN
Qty: 1 EACH | Refills: 11 | Status: SHIPPED | OUTPATIENT
Start: 2025-06-26

## 2025-06-26 ASSESSMENT — PATIENT HEALTH QUESTIONNAIRE - PHQ9
SUM OF ALL RESPONSES TO PHQ9 QUESTIONS 1 AND 2: 0
SUM OF ALL RESPONSES TO PHQ9 QUESTIONS 1 AND 2: 0
2. FEELING DOWN, DEPRESSED OR HOPELESS: NOT AT ALL
CLINICAL INTERPRETATION OF PHQ2 SCORE: NO FURTHER SCREENING NEEDED
1. LITTLE INTEREST OR PLEASURE IN DOING THINGS: NOT AT ALL

## 2025-07-15 ENCOUNTER — LAB SERVICES (OUTPATIENT)
Dept: LAB | Age: 50
End: 2025-07-15

## 2025-07-17 ENCOUNTER — RESULTS FOLLOW-UP (OUTPATIENT)
Dept: FAMILY MEDICINE | Age: 50
End: 2025-07-17

## 2025-08-02 DIAGNOSIS — J45.909 BRONCHITIS WITH ASTHMA, ACUTE (CMD): ICD-10-CM

## 2025-08-02 DIAGNOSIS — J20.9 BRONCHITIS WITH ASTHMA, ACUTE (CMD): ICD-10-CM

## 2025-08-04 RX ORDER — MONTELUKAST SODIUM 10 MG/1
10 TABLET ORAL DAILY
Qty: 90 TABLET | Refills: 0 | Status: SHIPPED | OUTPATIENT
Start: 2025-08-04 | End: 2025-09-03

## 2025-08-30 ENCOUNTER — NURSE TRIAGE (OUTPATIENT)
Dept: TELEHEALTH | Age: 50
End: 2025-08-30

## 2025-09-03 ENCOUNTER — APPOINTMENT (OUTPATIENT)
Dept: FAMILY MEDICINE | Age: 50
End: 2025-09-03

## 2025-09-08 ENCOUNTER — APPOINTMENT (OUTPATIENT)
Dept: FAMILY MEDICINE | Age: 50
End: 2025-09-08

## (undated) NOTE — LETTER
10/06/20    Can you please fax over CT scan to Dr. Korin Braden [phone] for Dulce Maria  05/09/1975.      Thanks [employer]